# Patient Record
Sex: MALE | Race: WHITE | NOT HISPANIC OR LATINO | URBAN - METROPOLITAN AREA
[De-identification: names, ages, dates, MRNs, and addresses within clinical notes are randomized per-mention and may not be internally consistent; named-entity substitution may affect disease eponyms.]

---

## 2019-09-03 ENCOUNTER — OFFICE VISIT - HEALTHEAST (OUTPATIENT)
Dept: FAMILY MEDICINE | Facility: CLINIC | Age: 54
End: 2019-09-03

## 2019-09-03 DIAGNOSIS — R68.82 DECREASED LIBIDO: ICD-10-CM

## 2019-09-03 DIAGNOSIS — G47.33 OBSTRUCTIVE SLEEP APNEA SYNDROME: ICD-10-CM

## 2019-09-03 DIAGNOSIS — M54.12 CERVICAL RADICULOPATHY: ICD-10-CM

## 2019-09-03 DIAGNOSIS — B07.8 OTHER VIRAL WARTS: ICD-10-CM

## 2019-09-03 RX ORDER — BACLOFEN 20 MG
TABLET ORAL
Status: SHIPPED | COMMUNITY
Start: 2019-09-03

## 2019-09-03 ASSESSMENT — MIFFLIN-ST. JEOR: SCORE: 1876.1

## 2019-09-09 ENCOUNTER — COMMUNICATION - HEALTHEAST (OUTPATIENT)
Dept: LAB | Facility: CLINIC | Age: 54
End: 2019-09-09

## 2019-09-09 DIAGNOSIS — Z13.220 SCREENING, LIPID: ICD-10-CM

## 2019-09-09 DIAGNOSIS — Z13.1 SCREENING FOR DIABETES MELLITUS: ICD-10-CM

## 2019-09-09 DIAGNOSIS — Z12.5 SCREENING FOR MALIGNANT NEOPLASM OF PROSTATE: ICD-10-CM

## 2019-09-10 ENCOUNTER — AMBULATORY - HEALTHEAST (OUTPATIENT)
Dept: LAB | Facility: CLINIC | Age: 54
End: 2019-09-10

## 2019-09-10 DIAGNOSIS — Z13.220 SCREENING, LIPID: ICD-10-CM

## 2019-09-10 DIAGNOSIS — Z13.1 SCREENING FOR DIABETES MELLITUS: ICD-10-CM

## 2019-09-10 DIAGNOSIS — Z12.5 SCREENING FOR MALIGNANT NEOPLASM OF PROSTATE: ICD-10-CM

## 2019-09-10 LAB
ALBUMIN SERPL-MCNC: 4 G/DL (ref 3.5–5)
ALP SERPL-CCNC: 69 U/L (ref 45–120)
ALT SERPL W P-5'-P-CCNC: 61 U/L (ref 0–45)
ANION GAP SERPL CALCULATED.3IONS-SCNC: 8 MMOL/L (ref 5–18)
AST SERPL W P-5'-P-CCNC: 37 U/L (ref 0–40)
BILIRUB SERPL-MCNC: 0.9 MG/DL (ref 0–1)
BUN SERPL-MCNC: 12 MG/DL (ref 8–22)
CALCIUM SERPL-MCNC: 9.4 MG/DL (ref 8.5–10.5)
CHLORIDE BLD-SCNC: 103 MMOL/L (ref 98–107)
CHOLEST SERPL-MCNC: 163 MG/DL
CO2 SERPL-SCNC: 29 MMOL/L (ref 22–31)
CREAT SERPL-MCNC: 0.87 MG/DL (ref 0.7–1.3)
FASTING STATUS PATIENT QL REPORTED: YES
GFR SERPL CREATININE-BSD FRML MDRD: >60 ML/MIN/1.73M2
GLUCOSE BLD-MCNC: 101 MG/DL (ref 70–125)
HDLC SERPL-MCNC: 41 MG/DL
LDLC SERPL CALC-MCNC: 94 MG/DL
POTASSIUM BLD-SCNC: 4.3 MMOL/L (ref 3.5–5)
PROT SERPL-MCNC: 7.2 G/DL (ref 6–8)
PSA SERPL-MCNC: 0.7 NG/ML (ref 0–3.5)
SODIUM SERPL-SCNC: 140 MMOL/L (ref 136–145)
TRIGL SERPL-MCNC: 139 MG/DL

## 2019-09-12 ENCOUNTER — COMMUNICATION - HEALTHEAST (OUTPATIENT)
Dept: FAMILY MEDICINE | Facility: CLINIC | Age: 54
End: 2019-09-12

## 2019-09-17 ENCOUNTER — HOSPITAL ENCOUNTER (OUTPATIENT)
Dept: MRI IMAGING | Facility: CLINIC | Age: 54
Discharge: HOME | End: 2019-09-17
Attending: FAMILY MEDICINE

## 2019-09-17 ENCOUNTER — HOSPITAL ENCOUNTER (OUTPATIENT)
Dept: RADIOLOGY | Facility: CLINIC | Age: 54
Discharge: HOME | End: 2019-09-17
Attending: FAMILY MEDICINE

## 2019-09-17 DIAGNOSIS — M54.12 CERVICAL RADICULOPATHY: ICD-10-CM

## 2019-09-19 ENCOUNTER — COMMUNICATION - HEALTHEAST (OUTPATIENT)
Dept: FAMILY MEDICINE | Facility: CLINIC | Age: 54
End: 2019-09-19

## 2019-10-15 ENCOUNTER — RECORDS - HEALTHEAST (OUTPATIENT)
Dept: ADMINISTRATIVE | Facility: OTHER | Age: 54
End: 2019-10-15

## 2019-12-04 ENCOUNTER — OFFICE VISIT - HEALTHEAST (OUTPATIENT)
Dept: SLEEP MEDICINE | Facility: CLINIC | Age: 54
End: 2019-12-04

## 2019-12-04 DIAGNOSIS — R29.818 SUSPECTED SLEEP APNEA: ICD-10-CM

## 2019-12-04 DIAGNOSIS — R06.83 SNORING: ICD-10-CM

## 2019-12-04 DIAGNOSIS — R03.0 ELEVATED BLOOD PRESSURE READING: ICD-10-CM

## 2019-12-04 DIAGNOSIS — G47.10 HYPERSOMNIA: ICD-10-CM

## 2019-12-04 ASSESSMENT — MIFFLIN-ST. JEOR: SCORE: 1916.01

## 2019-12-26 ENCOUNTER — RECORDS - HEALTHEAST (OUTPATIENT)
Dept: SLEEP MEDICINE | Facility: CLINIC | Age: 54
End: 2019-12-26

## 2019-12-26 ENCOUNTER — RECORDS - HEALTHEAST (OUTPATIENT)
Dept: ADMINISTRATIVE | Facility: OTHER | Age: 54
End: 2019-12-26

## 2019-12-26 DIAGNOSIS — R29.818 OTHER SYMPTOMS AND SIGNS INVOLVING THE NERVOUS SYSTEM: ICD-10-CM

## 2019-12-26 DIAGNOSIS — R06.83 SNORING: ICD-10-CM

## 2019-12-26 DIAGNOSIS — R03.0 ELEVATED BLOOD-PRESSURE READING, WITHOUT DIAGNOSIS OF HYPERTENSION: ICD-10-CM

## 2019-12-26 DIAGNOSIS — G47.10 HYPERSOMNIA, UNSPECIFIED: ICD-10-CM

## 2019-12-30 ENCOUNTER — AMBULATORY - HEALTHEAST (OUTPATIENT)
Dept: SLEEP MEDICINE | Facility: CLINIC | Age: 54
End: 2019-12-30

## 2019-12-30 ENCOUNTER — COMMUNICATION - HEALTHEAST (OUTPATIENT)
Dept: SLEEP MEDICINE | Facility: CLINIC | Age: 54
End: 2019-12-30

## 2019-12-30 DIAGNOSIS — G47.33 OBSTRUCTIVE SLEEP APNEA: ICD-10-CM

## 2020-01-20 ENCOUNTER — AMBULATORY - HEALTHEAST (OUTPATIENT)
Dept: OTHER | Facility: CLINIC | Age: 55
End: 2020-01-20

## 2020-01-23 ENCOUNTER — OFFICE VISIT - HEALTHEAST (OUTPATIENT)
Dept: SLEEP MEDICINE | Facility: CLINIC | Age: 55
End: 2020-01-23

## 2020-01-23 DIAGNOSIS — G47.10 HYPERSOMNIA: ICD-10-CM

## 2020-01-23 DIAGNOSIS — G47.33 OBSTRUCTIVE SLEEP APNEA: ICD-10-CM

## 2020-01-23 DIAGNOSIS — R03.0 ELEVATED BLOOD PRESSURE READING: ICD-10-CM

## 2020-01-23 RX ORDER — TRIAMCINOLONE ACETONIDE 1 MG/G
OINTMENT TOPICAL
Refills: 0 | Status: SHIPPED | COMMUNITY
Start: 2019-10-15

## 2020-01-23 ASSESSMENT — MIFFLIN-ST. JEOR: SCORE: 1938.69

## 2020-04-30 ENCOUNTER — OFFICE VISIT - HEALTHEAST (OUTPATIENT)
Dept: SLEEP MEDICINE | Facility: CLINIC | Age: 55
End: 2020-04-30

## 2020-04-30 DIAGNOSIS — G47.33 OBSTRUCTIVE SLEEP APNEA: ICD-10-CM

## 2020-04-30 DIAGNOSIS — G47.10 HYPERSOMNIA: ICD-10-CM

## 2021-06-01 NOTE — PROGRESS NOTES
ASSESSMENT:  1. Obstructive sleep apnea syndrome    We will get him over to our sleep clinic and they can have an assessment of them and determine whether he needs a sleep study or not.  We did discuss quickly using CPAP versus a mandibular advancement device and other options that he might have available.  Follow-up after he has seen the sleep doctors.    - Ambulatory referral to Sleep Medicine    2. Other viral warts    As the volume of his words are very large and he has a lot of them and it would be a bit too much of a project to freeze them all, will send to dermatology and they can discuss other ways of treating this large number of warts that he has.    - Ambulatory referral to Dermatology    3. Decreased libido    We discussed that this may be related to his sleep apnea, and he is not really wanting to do labs today so we will forego testing for that.  He was asking about Viagra but we talked about the fact that that will not help his libido will only help his erection performance and he will consider that.    4. Cervical radiculopathy    As is been a while since he has had imaging done on his neck, we will order another MRI and see if there is been any change in the anatomy and disc issues that he has had in the neck and follow-up with him accordingly.  - MR Cervical Spine Without Contrast; Future          PLAN:  There are no Patient Instructions on file for this visit.    Orders Placed This Encounter   Procedures     MR Cervical Spine Without Contrast     Standing Status:   Future     Standing Expiration Date:   9/3/2020     Scheduling Instructions:      SCHEDULING: Will patient schedule appointment? No     LOCATION:             LOCATIONS:        - [facility] ([facility]), PHONE: [phone]      [facility] PHONE: [phone]     Order Specific Question:   Reason for Exam (Describe Symptoms):     Answer:   neck pain, h/o herniated disc seen in 2016     Order Specific  Question:   Can the procedure be changed per Radiologist protocol?     Answer:   Yes     Order Specific Question:   Is the patient claustrophobic and in need of sedation to complete their MR scan?     Answer:   No     Ambulatory referral to Sleep Medicine     Referral Priority:   Routine     Referral Type:   Consultation     Referral Reason:   Evaluation and Treatment     Requested Specialty:   Sleep Medicine     Number of Visits Requested:   1     Ambulatory referral to Dermatology     Referral Priority:   Routine     Referral Type:   Consultation     Referral Reason:   Evaluation and Treatment     Requested Specialty:   Dermatology     Number of Visits Requested:   1     Medications Discontinued During This Encounter   Medication Reason     guaiFENesin (MUCINEX) 600 mg tp12 12 hr tablet        No follow-ups on file.    CHIEF COMPLAINT:  Chief Complaint   Patient presents with     Neck Pain     referral for sleep study - neck is bothersome (had MRI 3-4 years ago and has done PT), labs for testosterone and blood sugar labs as having fatigue, itchy skin, warts on both hands and forearms for abut 1.5 years       HISTORY OF PRESENT ILLNESS:  Marvin is a 54 y.o. male presenting to the clinic today as a returning new patient.  He used to be seen at the [facility] but that is been more than 4 years ago.  He was out in [facility] for a while and now his insurance changed and he is back with us.  He has several things on his list today.    He would like to have a referral for a sleep study.  He states that he snores very loudly and his sleep partner is bit frustrated by it.  They have not tried anything over-the-counter for it.  He is overweight and knows that that plays a part in it.  This is become an issue more so over the last couple of years.  He has tried elevating the head of his bed a bit as well.  His wife does state that he does stop breathing so is more than just snoring and they are concerned about  "possible apnea    He also has neck pain.  This is been chronic.  He actually has had an MRI about 4 years ago and is done some physical therapy in the past.  His neck is getting worse and now is having more symptoms down into his shoulders and into the arms and even into the hands at times.  He will get numbness and tingling occasionally down the arms.  He uses some Advil which does not really seem to help all that much.  His he states it is painful just to move his neck around and range of motion.  He knows of no weakness into the hands.    He is also wondering if he can get some labs for blood sugar and testosterone but is not fasting today.  He is having decreased libido and he wonders if that is from the sleep apnea or from something else.  He is also wondering if he can get some of the labs done that again he is not fasting so he will come back for those.    He also has warts.  He has had warts up and down his forearms and hands for a couple of years.  They are very numerous and not really all that painful but are certainly cosmetically unappealing to him.  Some of them will get irritated and bleed once in a while.  But they are just becoming much more numerous he wants to see a dermatologist or get to treat it somehow.    REVIEW OF SYSTEMS:     All other systems are negative.    PFSH:    Reviewed    Patient is new patient to the clinic. Please see past medical history, surgical history, social history and family history, all of which were completed in their entirety today.         TOBACCO USE:  Social History     Tobacco Use   Smoking Status Never Smoker   Smokeless Tobacco Never Used       VITALS:  Vitals:    09/03/19 1524   BP: 132/88   Patient Site: Left Arm   Patient Position: Sitting   Cuff Size: Adult Regular   Pulse: 79   SpO2: 94%   Weight: (!) 226 lb 3.2 oz (102.6 kg)   Height: 5' 11.5\" (1.816 m)     Wt Readings from Last 3 Encounters:   09/03/19 (!) 226 lb 3.2 oz (102.6 kg)   01/14/15 (!) 223 lb 0.3 " oz (101.2 kg)     Body mass index is 31.11 kg/m .    PHYSICAL EXAM:  Constitutional:  Reveals well-appearing male no acute distress..  Vitals:  Per nursing notes.    Ears are clear bilaterally.  Eyes and nose are normal.  Oropharynx pink moist without erythema or exudate.    Skin: Shows innumerable warts on the hands and forearms up onto the upper arms as well.  Some of them are irritated and seems to be actually growing up on top of each other.    Cardiac: Regular rate and rhythm without murmurs, rubs, or gallops.     Legs no edema  Palpation of the radial pulse normal.  Lungs: Clear.  Respiratory effort normal.    Neurologic:  Cranial nerves II-XII intact.     Psychiatric:  Mood appropriate, memory intact.       ADDITIONAL HISTORY SUMMARIZED (2): 2  CARE EVERYWHERE/ EXTRA INFORMATION (1): None.   RADIOLOGY TESTS (1): 0  LABS (1): 0  CARDIOLOGY/MEDICINE TESTS (1): 0  INDEPENDENT REVIEW (2 each): None.     MEDICATIONS:  Current Outpatient Medications   Medication Sig Dispense Refill     magnesium oxide 500 mg Tab Take by mouth.       No current facility-administered medications for this visit.

## 2021-06-01 NOTE — TELEPHONE ENCOUNTER
----- Message from Issac Peña MD sent at 9/19/2019  7:42 AM CDT -----  Overall, MRI looks pretty much similar to your last one.    Nothing that is terribly concerning to me.    TS

## 2021-06-01 NOTE — TELEPHONE ENCOUNTER
Patient has a lab appointment on 9-10 but there are no orders. Please place orders as needed.      Suzie Emery

## 2021-06-02 ENCOUNTER — RECORDS - HEALTHEAST (OUTPATIENT)
Dept: ADMINISTRATIVE | Facility: CLINIC | Age: 56
End: 2021-06-02

## 2021-06-03 VITALS
SYSTOLIC BLOOD PRESSURE: 149 MMHG | BODY MASS INDEX: 31.83 KG/M2 | DIASTOLIC BLOOD PRESSURE: 92 MMHG | WEIGHT: 235 LBS | HEART RATE: 80 BPM | OXYGEN SATURATION: 92 % | HEIGHT: 72 IN

## 2021-06-03 VITALS
BODY MASS INDEX: 30.64 KG/M2 | WEIGHT: 226.2 LBS | DIASTOLIC BLOOD PRESSURE: 88 MMHG | HEIGHT: 72 IN | SYSTOLIC BLOOD PRESSURE: 132 MMHG | HEART RATE: 79 BPM | OXYGEN SATURATION: 94 %

## 2021-06-03 NOTE — PROGRESS NOTES
Dear Dr. Peña, Issac ANDERSON Md  [address] [place] [address]    Thank you for the opportunity to participate in the care of . Marvin Jaime.    Assessment and Plan:    In summary Marvin Jaime is a 54 y.o. year old male here for sleep disturbance.  1.  Suspected sleep apnea   Mr. Marvin Jaime has high risk for obstructive sleep apnea based on the witnessed apnea, snoring and a crowded airway. I educated the patient on the underlying pathophysiology of obstructive sleep apnea. We reviewed the risks associated with sleep apnea, including increased cardiovascular risk and overall death. We talked about treatments briefly. I recommend getting a home apnea test. The patient should return to the clinic to discuss results and treatment option in a patient-centered approach.  2.  Hypersomnia  3.  Snoring  4.  Elevated blood pressure reading  I will have the patient's blood pressure readings repeated during this clinic visit. I strongly advised the patient to follow up with PCP in one week.    History of present illness:    He is a 54 y.o. male who comes to the clinic with a chief complaint of excessive daytime sleepiness that is been going on for approximately 2 years.  The patient's wife has been complaining about his pauses in his breathing during sleep followed by loud snoring.  He also admits that he has been suffering from work-related stress that may have an adverse effect on his quality of sleep.  The patient review of systems otherwise negative.     Ideal Sleep-Wake Cycle(devoid of societal pressure):    Patient would try to initiate sleep at around 10-11 PM with a sleep latency of 30 minutes to an hour. The patient would have 4-5 awakenings. Final wake up time is around 9-10 AM.    Patient told to return in one week after the sleep study is interpreted.    Past Medical History  No past medical history on file.     Past Surgical History  Past Surgical History:   Procedure Laterality Date      HERNIA REPAIR      inguinal        Meds  Current Outpatient Medications   Medication Sig Dispense Refill     magnesium oxide 500 mg Tab Take by mouth.       No current facility-administered medications for this visit.         Allergies  Patient has no known allergies.     Social History  Social History     Socioeconomic History     Marital status:      Spouse name: Not on file     Number of children: Not on file     Years of education: Not on file     Highest education level: Not on file   Occupational History     Not on file   Social Needs     Financial resource strain: Not on file     Food insecurity:     Worry: Not on file     Inability: Not on file     Transportation needs:     Medical: Not on file     Non-medical: Not on file   Tobacco Use     Smoking status: Never Smoker     Smokeless tobacco: Never Used   Substance and Sexual Activity     Alcohol use: Yes     Comment: 1/month     Drug use: Never     Sexual activity: Yes     Partners: Female   Lifestyle     Physical activity:     Days per week: Not on file     Minutes per session: Not on file     Stress: Not on file   Relationships     Social connections:     Talks on phone: Not on file     Gets together: Not on file     Attends Faith service: Not on file     Active member of club or organization: Not on file     Attends meetings of clubs or organizations: Not on file     Relationship status: Not on file     Intimate partner violence:     Fear of current or ex partner: Not on file     Emotionally abused: Not on file     Physically abused: Not on file     Forced sexual activity: Not on file   Other Topics Concern     Not on file   Social History Narrative     Not on file        Family History  Family History   Problem Relation Age of Onset     Snoring Father       Review of Systems:  Constitutional: Negative except as noted in HPI.   Eyes: Negative except as noted in HPI.   ENT: Negative except as noted in HPI.   Cardiovascular: Negative except as  "noted in HPI.   Respiratory: Negative except as noted in HPI.   Gastrointestinal: Negative except as noted in HPI.   Genitourinary: Negative except as noted in HPI.   Musculoskeletal: Negative except as noted in HPI.   Integumentary: Negative except as noted in HPI.   Neurological: Negative except as noted in HPI.   Psychiatric: Negative except as noted in HPI.   Endocrine: Negative except as noted in HPI.   Hematologic/Lymphatic: Negative except as noted in HPI.      STOP BANG 12/4/2019   Do you snore loudly (louder than talking or loud enough to be heard through closed doors)? 1   Do you often feel tired, fatigued, or sleepy during daytime? 1   Has anyone observed you stop breathing in your sleep? 1   Do you have or are you being treated for high blood pressure? 0   BMI more than 35 kg/m2 1   Age over 50 years old? 1   Neck circumference greater than 16 inches? 1   Gender male? 1   Total Score 7     Epworths Sleepiness Scale 12/4/2019   Sitting and reading 3   Watching TV 0   Sitting, inactive in a public place (e.g. a theatre or a meeting) 1   As a passenger in a car for an hour without a break 3   Lying down to rest in the afternoon when circumstances permit 3   Sitting and talking to someone 0   Sitting quietly after a lunch without alcohol 3   In a car, while stopped for a few minutes in traffic 1   Total score 14     Rooming 12/4/2019   Usual bedtime 8-10   Sleep Latency 30-60 minutes   Awakenings 4-5 times   Wake Up Time 4-6:00   Weekends varies   Energy Drinks 0   Coffee 0   Cola 0   Difficulty falling asleep Yes   Difficulty staying asleep Yes   Excessive daytime tiredness Yes   Excessive daytime sleepiness Yes   Dozing off while driving No   Shift Worker No   Sleep Walking? No   Sleep Talking? Yes   Kicking or punching? No   Restless legs symptoms No       Physical Exam:  BP (!) 149/92   Pulse 80   Ht 5' 11.5\" (1.816 m)   Wt (!) 235 lb (106.6 kg)   SpO2 92%   BMI 32.32 kg/m    BMI:Body mass index is " "32.32 kg/m .   GEN: NAD, obese  Head: Normocephalic.  EYES: PERRLA, EOMI  ENT: Oropharynx is clear, Gill class 4+ airway.   Nasal mucosa is moist without erythema  Neck : Thyroid is within normal limits. Neck Circ 18\"  CV: Regular rate and rhythm, S1 & S2 positive.  LUNGS: Bilateral breathsounds heard.   ABDOMEN: Positive bowel sounds in all quadrants, soft, no rebound or guarding  MUSCULOSKELETAL: Bilateral trace leg swelling  SKIN: warm, dry, no rashes  Neurological: Alert, oriented to time, place, and person.  Psych: normal mood, normal affect     Labs/Studies:     No results found for: WBC, HGB, HCT, MCV, PLT      Chemistry        Component Value Date/Time     09/10/2019 0741    K 4.3 09/10/2019 0741     09/10/2019 0741    CO2 29 09/10/2019 0741    BUN 12 09/10/2019 0741    CREATININE 0.87 09/10/2019 0741     09/10/2019 0741        Component Value Date/Time    CALCIUM 9.4 09/10/2019 0741    ALKPHOS 69 09/10/2019 0741    AST 37 09/10/2019 0741    ALT 61 (H) 09/10/2019 0741    BILITOT 0.9 09/10/2019 0741            No results found for: FERRITIN  No results found for: TSH      Patient verbalized understanding of these issues, agrees with the plan and all questions were answered today. Patient was given an opportuntity to voice any other symptoms or concerns not listed above. Patient did not have any other symptoms or concerns.         Noah Renee DO  Board Certified in Internal Medicine and Sleep Medicine  [facility].    (Note created with Dragon voice recognition and unintended spelling errors and word substitutions may occur)     "

## 2021-06-03 NOTE — PATIENT INSTRUCTIONS - HE
Follow-up with PCP regarding elevated blood pressure:   BP Readings from Last 3 Encounters:   12/04/19 (!) 149/92   09/03/19 132/88   01/14/15 124/84     What is a Home Sleep Study?    your doctor can give you a portable sleep monitor to use at home, so you don t have to spend the night in the sleep lab. But you should use a portable monitor only if:   ?Your doctor thinks you have a condition that makes you stop breathing for short periods while you are asleep, called  sleep apnea.    ?You do not have other serious medical problems, such as heart disease or lung disease.    Please bring the home sleep study device back to the sleep center as soon as you are finished with it so we can score it.     The cost of care estimate line is [phone]. They are able to give the patient an estimate of the charges and also an estimate of their insurance coverage/patient responsibility.   After your sleep study is performed, please call us at [phone] to schedule for a follow up to review the results of the sleep study.    Consider using one tab of low dose melatonin 3 mg or less on the night of the study.    It is completely voluntary.    Do not drive or operate machinery after intake of melatonin.

## 2021-06-04 VITALS
DIASTOLIC BLOOD PRESSURE: 84 MMHG | BODY MASS INDEX: 32.51 KG/M2 | HEART RATE: 77 BPM | SYSTOLIC BLOOD PRESSURE: 167 MMHG | OXYGEN SATURATION: 92 % | WEIGHT: 240 LBS | HEIGHT: 72 IN

## 2021-06-04 NOTE — TELEPHONE ENCOUNTER
The overnight polysomnography was reviewed.   The patient had obstructive sleep apnea.    I have called the patient and informed his of the results. I offered the patient the option of getting started on pressure therapy and the patient agreed. The patient would like to use [employer] as his durable medical equipment company.    The prescription is in the chart.    Contact information for [employer] company:    -[employer] Tel: [phone]    Thank you.

## 2021-06-04 NOTE — PROGRESS NOTES
Order for Durable Medical Equipment was processed and equipment ordered.     DME provider: [facility]    Date Faxed: 12/30/2019    Ordering Provider: Noah Renee DO    PAP Order Type: New Device Order    Fax Number: IN HOUSE DME: [facility]

## 2021-06-05 NOTE — PROGRESS NOTES
Dear Dr. Peña, Issac ANDERSON MD  [address] [place] [address],    Thank you for the opportunity to participate in the care of Marvin Jaime.     He is a 55 y.o.  male patient who comes to the sleep medicine clinic for review of his sleep study and compliance download data. The study was completed on 12/26/2019 which showed that the patient had moderate obstructive sleep apnea with an apnea hypopnea index of 28.2 events per hour with the lowest O2 sat of 69%.  The patient was informed of the results by phone and offer the option to initiate pressure therapy which he agreed to.  Since starting CPAP, he reports that his sleep quality has improved slightly.  However it is only has been 2 days.    Assessment and Plan:    1. Obstructive sleep apnea  I reviewed the results of the patient's sleep study with him in detail.  I encouraged him to try to continue using his CPAP machine.  I will narrow his pressure range 7-15 CWP and had him test out this pressure setting in front of me.  He felt it was comfortable.  - CPAP DME Sleep Medicine HE    2. Hypersomnia  Slight improvement.    3. Elevated blood pressure reading  I will have the patient's blood pressure readings repeated during this clinic visit. I strongly advised the patient to follow up with PCP in one week.    Compliance Download data for 2 Days:  Pressure setting: APAP 5-20 CWP  Residual AHI: 3.3 events per hour  Leak: Minimal  Compliance: 100%  Mask Tolerance: Good  Skin irritation: None      Current Outpatient Medications   Medication Sig Dispense Refill     magnesium oxide 500 mg Tab Take by mouth.       triamcinolone (KENALOG) 0.1 % ointment   0     No current facility-administered medications for this visit.        No Known Allergies    Epworths Sleepiness Scale 12/4/2019 1/23/2020   Sitting and reading 3 3   Watching TV 0 1   Sitting, inactive in a public place (e.g. a theatre or a meeting) 1 2   As a passenger in a car for an hour without a break 3 2  "  Lying down to rest in the afternoon when circumstances permit 3 3   Sitting and talking to someone 0 0   Sitting quietly after a lunch without alcohol 3 1   In a car, while stopped for a few minutes in traffic 1 0   Total score 14 12       Physical Exam:  BP (!) 170/96   Pulse 77   Ht 5' 11.5\" (1.816 m)   Wt (!) 240 lb (108.9 kg)   SpO2 92%   BMI 33.01 kg/m    BMI:Body mass index is 33.01 kg/m .   GEN: NAD, obese  Psych: normal mood, normal affect     Labs/Studies:  - We reviewed the results of the overnight PSG as described on the HPI.       Patient verbalized understanding of these issues, agrees with the plan and all questions were answered today. Patient was given an opportuntity to voice any other symptoms or concerns not listed above. Patient did not have any other symptoms or concerns.        Noah Renee DO  Board Certified in Internal Medicine and Sleep Medicine      (Note created with Dragon voice recognition and unintended spelling errors and word substitutions may occur)     "

## 2021-06-05 NOTE — PROGRESS NOTES
[facility] was [facility]  Patient Marvin Jaime was set up at [facility] on January 20, 2020.  Patient received a Resmed Airsense 10 CPAP. Pressures were set at 5-20 CM H2O.   Patient s ramp is 4 cm H2O for AUTO and FLEX/EPR is EPR 2 .  Patient received a Respironics Mask name: Dreamstation  Nasal Mask Size Medium Frame; Medium Cushion, heated tubing and heated humidifier.    Tegan Silva

## 2021-06-05 NOTE — PROGRESS NOTES
Order for Durable Medical Equipment was processed and equipment ordered.     DME provider: [facility]    Date Faxed: 1/23/2020    Ordering Provider: Noah Renee DO    PAP Order Type: Setting/pressure change    Fax Number: Sent to [facility]

## 2021-06-07 NOTE — PROGRESS NOTES
"Marvin Jaime is a 55 y.o. male who is being evaluated via a billable telephone visit.      The patient has been notified of following:     \"This telephone visit will be conducted via a call between you and your physician/provider. We have found that certain health care needs can be provided without the need for a physical exam.  This service lets us provide the care you need with a short phone conversation.  If a prescription is necessary we can send it directly to your pharmacy.  If lab work is needed we can place an order for that and you can then stop by our lab to have the test done at a later time.    Telephone visits are billed at different rates depending on your insurance coverage. During this emergency period, for some insurers they may be billed the same as an in-person visit.  Please reach out to your insurance provider with any questions.    If during the course of the call the physician/provider feels a telephone visit is not appropriate, you will not be charged for this service.\"    Patient has given verbal consent to a Telephone visit? Yes    Patient would like to receive their AVS by AVS Preference: Mail a copy.    Nadia Redmond LPN    Purpose of Call:    The patient states that the current pressure setting ramps up a bit too high, but overall, it is effective. He states that his sleep is much more refreshing compared to the last clinic visit. He is less sleepy during the day and his sinus issues have improved    Compliance Download data for 30 Days:  Pressure setting:APAP 7-15 cwp  Residual AHI:1.5 events per hour  Leak:Minimal  Compliance:100%  Mask Tolerance:Good  Skin irritation:None    Assessment/Plan:  1. Obstructive sleep apnea  CPAP DME Sleep Medicine HE   2. Hypersomnia  CPAP DME Sleep Medicine HE     I congratulated the patient on his excellent usage. I will narrow his pressure settings further to 7-11 cwp and welcomed him to follow up with me every 2 years.    I have reviewed the " note as documented above.  This accurately captures the substance of my conversation with the patient.    Phone call contact time: 6 minutes    Noah Renee DO  Board Certified in Internal Medicine and Sleep Medicine    Patient verbalized understanding of these issues, agrees with the plan and all questions were answered today. Patient was given an opportuntity to voice any other symptoms or concerns not listed above. Patient did not have any other symptoms or concerns.

## 2021-06-07 NOTE — PROGRESS NOTES
Order for Durable Medical Equipment was processed and equipment ordered.     DME provider: [facility]    Date Faxed: 4/30/2020    Ordering Provider: Noah Renee DO    PAP Order Type: Setting/Pressure change  Min: 7  Max: 11    Fax Number: Sent to [place]: [facility]ACK[facility]

## 2021-06-16 PROBLEM — G47.33 OBSTRUCTIVE SLEEP APNEA SYNDROME: Status: ACTIVE | Noted: 2019-09-03

## 2021-06-16 NOTE — TELEPHONE ENCOUNTER
Telephone Encounter by Mariana Bailey CMA at 12/30/2019  2:40 PM     Author: Mariana Bailey CMA Service: -- Author Type: Certified Medical Assistant    Filed: 12/30/2019  2:41 PM Encounter Date: 12/30/2019 Status: Signed    : Mariana Bailey CMA (Certified Medical Assistant)       This has been address in a separate encounter. See below for details:    Mariana Bailey CMA 12/30/2019 2:41 PM

## 2021-06-17 NOTE — PATIENT INSTRUCTIONS - HE
"Patient Instructions by Noah Renee DO at 1/23/2020  2:00 PM     Author: Noah Renee DO Service: -- Author Type: Physician    Filed: 1/23/2020  2:22 PM Encounter Date: 1/23/2020 Status: Addendum    : Noah Renee DO (Physician)    Related Notes: Original Note by Noah Renee DO (Physician) filed at 1/23/2020  2:19 PM         What is sleep apnea?    Sleep apnea is a condition that makes you stop breathing for short periods while you are asleep. There are 2 types of sleep apnea. One is called \"obstructive sleep apnea,\" and the other is called \"central sleep apnea.\"  In obstructive sleep apnea, you stop breathing because your throat narrows or closes (figure 1). In central sleep apnea, you stop breathing because your brain does not send the right signals to your muscles to make you breathe. When people talk about sleep apnea, they are usually referring to obstructive sleep apnea, which is what this article is about.  People with sleep apnea do not know that they stop breathing when they are asleep. But they do sometimes wake up startled or gasping for breath. They also often hear from loved ones that they snore.  What are the symptoms of sleep apnea? -- The main symptoms of sleep apnea are loud snoring, tiredness, and daytime sleepiness. Other symptoms can include:  ?Restless sleep  ?Waking up choking or gasping  ?Morning headaches, dry mouth, or sore throat  ?Waking up often to urinate  ?Waking up feeling unrested or groggy  ?Trouble thinking clearly or remembering things  Some people with sleep apnea don't have symptoms, or they don't know they have them. They might figure that it's normal to be tired or to snore a lot.  Should I see a doctor or nurse? -- Yes. If you think you might have sleep apnea, see your doctor.  Is there a test for sleep apnea? -- Yes. If your doctor or nurse suspects you have sleep apnea, he or she might send you for a \"sleep study.\" Sleep studies can sometimes be " "done at home, but they are usually done in a sleep lab. For the study, you spend the night in the lab, and you are hooked up to different machines that monitor your heart rate, breathing, and other body functions. The results of the test will tell your doctor or nurse if you have the disorder.  Is there anything I can do on my own to help my sleep apnea? -- Yes. Here are some things that might help:  ?Stay off your back when sleeping. (This is not always practical, because people cannot control their position while asleep. Plus, it only helps some people.)  ?Lose weight, if you are overweight  ?Avoid alcohol, because it can make sleep apnea worse  How is sleep apnea treated? -- The most effective treatment for sleep apnea is a device that keeps your airway open while you sleep. Treatment with this device is called \"continuous positive airway pressure,\" or CPAP. People getting CPAP wear a face mask at night that keeps them breathing (figure 2).  If your doctor or nurse recommends a CPAP machine, try to be patient about using it. The mask might seem uncomfortable to wear at first, and the machine might seem noisy, but using the machine can really pay off. People with sleep apnea who use a CPAP machine feel more rested and generally feel better.  There is also another device that you wear in your mouth called an \"oral appliance\" or \"mandibular advancement device.\" It also helps keep your airway open while you sleep.  In rare cases, when nothing else helps, doctors recommend surgery to keep the airway open. Surgery to do this is not always effective, and even when it is, the problem can come back.  Is sleep apnea dangerous? -- It can be. People with sleep apnea do not get good-quality sleep, so they are often tired and not alert. This puts them at risk for car accidents and other types of accidents. Plus, studies show that people with sleep apnea are more likely than others to have high blood pressure, heart attacks, " and other serious heart problems. In people with severe sleep apnea, getting treated (for example, with a CPAP machine) can help prevent some of these problems.    GRAPHICS  Airway in a person with sleep apnea    Normally when a person sleeps, the airway remains open, and air can pass from the nose and mouth to the lungs. In a person with sleep apnea, parts of the throat and mouth drop into the airway and block off the flow of air. This can cause loud snoring and interrupt breathing for short periods.  Graphic 43111 Version 5.0    Continuous positive airway pressure (CPAP) for sleep apnea    The CPAP mask gently blows air into your nose while you sleep. It puts just enough pressure on your airway to keep it from closing. The mask in this picture fits over just the nose. Other CPAP devices have masks that fit over the nose and mouth.    Please bring your machine, mask, hose and power cord on the next clinical visit with me. Thank you.    Follow-up with PCP regarding elevated blood pressure:   BP Readings from Last 3 Encounters:   01/23/20 (!) 170/96   12/04/19 (!) 149/92   09/03/19 132/88

## 2021-06-19 NOTE — LETTER
Letter by Issac Peña MD at      Author: Issac Peña MD Service: -- Author Type: --    Filed:  Encounter Date: 9/12/2019 Status: (Other)         Marvin Jaime  [address]  [place] [address]             September 12, 2019         Dear Mr. Jaime,    Below are the results from your recent visit:    Resulted Orders   Lipid Profile   Result Value Ref Range    Triglycerides 139 <=149 mg/dL    Cholesterol 163 <=199 mg/dL    LDL Calculated 94 <=129 mg/dL    HDL Cholesterol 41 >=40 mg/dL    Patient Fasting > 8hrs? Yes    Comprehensive Metabolic Panel   Result Value Ref Range    Sodium 140 136 - 145 mmol/L    Potassium 4.3 3.5 - 5.0 mmol/L    Chloride 103 98 - 107 mmol/L    CO2 29 22 - 31 mmol/L    Anion Gap, Calculation 8 5 - 18 mmol/L    Glucose 101 70 - 125 mg/dL    BUN 12 8 - 22 mg/dL    Creatinine 0.87 0.70 - 1.30 mg/dL    GFR MDRD Af Amer >60 >60 mL/min/1.73m2    GFR MDRD Non Af Amer >60 >60 mL/min/1.73m2    Bilirubin, Total 0.9 0.0 - 1.0 mg/dL    Calcium 9.4 8.5 - 10.5 mg/dL    Protein, Total 7.2 6.0 - 8.0 g/dL    Albumin 4.0 3.5 - 5.0 g/dL    Alkaline Phosphatase 69 45 - 120 U/L    AST 37 0 - 40 U/L    ALT 61 (H) 0 - 45 U/L    Narrative    Fasting Glucose reference range is 70-99 mg/dL per  American Diabetes Association (ADA) guidelines.   PSA (Prostatic-Specific Antigen), Annual Screen   Result Value Ref Range    PSA 0.7 0.0 - 3.5 ng/mL    Narrative    Method is Abbott Prostate-Specific Antigen (PSA)  Standard-WHO 1st International (90:10)        Your prostate test is normal.  We can continue to follow this yearly.      Your cholesterol profile is very good.  Your LDL (bad) cholesterol is at a good level, and your HDL  (good) cholesterol is also very good.  Triglycerides are also within a normal range.      Your complete metabolic panel is good.  Your blood sugars are normal, and your kidney function tests  and liver function tests are in a normal range.     Please call with questions or contact us  using [employer].    Sincerely,        Electronically signed by Issac Peña MD